# Patient Record
Sex: FEMALE | URBAN - METROPOLITAN AREA
[De-identification: names, ages, dates, MRNs, and addresses within clinical notes are randomized per-mention and may not be internally consistent; named-entity substitution may affect disease eponyms.]

---

## 2018-08-11 ENCOUNTER — APPOINTMENT (OUTPATIENT)
Dept: URBAN - METROPOLITAN AREA CLINIC 237 | Age: 73
Setting detail: DERMATOLOGY
End: 2018-08-11

## 2018-08-11 DIAGNOSIS — L85.3 XEROSIS CUTIS: ICD-10-CM

## 2018-08-11 DIAGNOSIS — L82.1 OTHER SEBORRHEIC KERATOSIS: ICD-10-CM

## 2018-08-11 DIAGNOSIS — B07.8 OTHER VIRAL WARTS: ICD-10-CM

## 2018-08-11 DIAGNOSIS — L57.8 OTHER SKIN CHANGES DUE TO CHRONIC EXPOSURE TO NONIONIZING RADIATION: ICD-10-CM

## 2018-08-11 DIAGNOSIS — Z41.9 ENCOUNTER FOR PROCEDURE FOR PURPOSES OTHER THAN REMEDYING HEALTH STATE, UNSPECIFIED: ICD-10-CM

## 2018-08-11 DIAGNOSIS — L82.0 INFLAMED SEBORRHEIC KERATOSIS: ICD-10-CM

## 2018-08-11 PROCEDURE — 99202 OFFICE O/P NEW SF 15 MIN: CPT | Mod: 25

## 2018-08-11 PROCEDURE — OTHER COUNSELING: OTHER

## 2018-08-11 PROCEDURE — OTHER PATIENT SPECIFIC COUNSELING: OTHER

## 2018-08-11 PROCEDURE — OTHER DIAGNOSIS COMMENT: OTHER

## 2018-08-11 PROCEDURE — 17110 DESTRUCT B9 LESION 1-14: CPT

## 2018-08-11 PROCEDURE — OTHER EDUCATIONAL RESOURCES PROVIDED: OTHER

## 2018-08-11 PROCEDURE — OTHER REASSURANCE: OTHER

## 2018-08-11 PROCEDURE — OTHER LIQUID NITROGEN: OTHER

## 2018-08-11 PROCEDURE — OTHER PRESCRIPTION: OTHER

## 2018-08-11 RX ORDER — AMMONIUM LACTATE 120 MG/G
SM AMT LOTION TOPICAL QHS
Qty: 1 | Refills: 0 | Status: ERX | COMMUNITY
Start: 2018-08-11

## 2018-08-11 ASSESSMENT — LOCATION ZONE DERM
LOCATION ZONE: TRUNK
LOCATION ZONE: LEG

## 2018-08-11 ASSESSMENT — LOCATION DETAILED DESCRIPTION DERM
LOCATION DETAILED: RIGHT LATERAL UPPER BACK
LOCATION DETAILED: RIGHT ANTERIOR DISTAL THIGH
LOCATION DETAILED: RIGHT RIB CAGE
LOCATION DETAILED: LEFT ANTERIOR DISTAL THIGH
LOCATION DETAILED: RIGHT DISTAL POSTERIOR THIGH
LOCATION DETAILED: LEFT DISTAL POSTERIOR THIGH

## 2018-08-11 ASSESSMENT — LOCATION SIMPLE DESCRIPTION DERM
LOCATION SIMPLE: LEFT THIGH
LOCATION SIMPLE: LEFT POSTERIOR THIGH
LOCATION SIMPLE: RIGHT THIGH
LOCATION SIMPLE: RIGHT UPPER BACK
LOCATION SIMPLE: RIGHT POSTERIOR THIGH
LOCATION SIMPLE: ABDOMEN

## 2018-08-11 NOTE — PROCEDURE: PATIENT SPECIFIC COUNSELING
MD recommends re-tx’ing with LN2 since it has such a large footprint. Advised lesion would will likely require multiple txs of LN2. If not responding to LN2, surgical removal is an option, but not ideal given size (large healing area if excised).
Detail Level: Simple
Discussed Juvederm Ultra Plus for chin, but Pt states she’s allergy to lidocaine (hives, no breathing problems), which is a component of Juvederm XC. Pt has used Xylocaine with Dr. Givens for some removal with no problems. MD would consider testing pt with lidocaine before filler. Pt would definitely need 2 syringes of filler. \\n\\nDiscussed Botox to mentalis and and upper lip to help soften rhytides (18 units). Advised this would be less expensive than Juvederm, but more temporary. Glabella (24 units) could also benefit from Botox (which Pt has had before).\\n\\nPt wonders if a facelift would be helpful. Ptwould benefit from facelift or mini facelift, but still would need fillers.
Discussed using LacH to any skin, which should also help crepey skin to some degree. \\n\\nAlso consider DerMend products in future for crepey skin. MD suggested RA to help build up collagen, but Pt used on face in the past and found very irritating.

## 2018-08-11 NOTE — PROCEDURE: LIQUID NITROGEN
Include Z78.9 (Other Specified Conditions Influencing Health Status) As An Associated Diagnosis?: No
Medical Necessity Clause: This procedure was medically necessary because the lesions that were treated were:
Number Of Freeze-Thaw Cycles: 3 freeze-thaw cycles
Consent: The patient/parent's consent was obtained including but not limited to risks of crusting, scabbing, blistering, scarring, darker or lighter pigmentary change, recurrence, incomplete removal.
Aperture Size (Optional): D
Detail Level: Simple
Medical Necessity Information: It is in your best interest to select a reason for this procedure from the list below. All of these items fulfill various CMS LCD requirements except the new and changing color options.
Post-Care Instructions: I reviewed with the patient in detail post-care instructions. Patient is to leave areas alone, and resume any home care if nothing happens, or when the scab falls off.

## 2018-10-10 ENCOUNTER — APPOINTMENT (OUTPATIENT)
Dept: URBAN - METROPOLITAN AREA CLINIC 237 | Age: 73
Setting detail: DERMATOLOGY
End: 2018-10-10

## 2018-10-10 DIAGNOSIS — R21 RASH AND OTHER NONSPECIFIC SKIN ERUPTION: ICD-10-CM

## 2018-10-10 DIAGNOSIS — B07.8 OTHER VIRAL WARTS: ICD-10-CM

## 2018-10-10 DIAGNOSIS — L82.0 INFLAMED SEBORRHEIC KERATOSIS: ICD-10-CM

## 2018-10-10 PROCEDURE — OTHER PRESCRIPTION: OTHER

## 2018-10-10 PROCEDURE — OTHER MIPS QUALITY: OTHER

## 2018-10-10 PROCEDURE — OTHER LIQUID NITROGEN: OTHER

## 2018-10-10 PROCEDURE — 99213 OFFICE O/P EST LOW 20 MIN: CPT | Mod: 25

## 2018-10-10 PROCEDURE — OTHER DIAGNOSIS COMMENT: OTHER

## 2018-10-10 PROCEDURE — OTHER PATIENT SPECIFIC COUNSELING: OTHER

## 2018-10-10 PROCEDURE — OTHER TREATMENT REGIMEN: OTHER

## 2018-10-10 PROCEDURE — OTHER ORDER TESTS: OTHER

## 2018-10-10 PROCEDURE — 17110 DESTRUCT B9 LESION 1-14: CPT

## 2018-10-10 RX ORDER — BETAMETHASONE DIPROPIONATE 0.5 MG/G
SMALL AMT CREAM TOPICAL BID
Qty: 1 | Refills: 0 | Status: ERX | COMMUNITY
Start: 2018-10-10

## 2018-10-10 ASSESSMENT — LOCATION DETAILED DESCRIPTION DERM
LOCATION DETAILED: RIGHT LATERAL ABDOMEN
LOCATION DETAILED: LEFT MID-UPPER BACK
LOCATION DETAILED: RIGHT RIB CAGE
LOCATION DETAILED: LEFT DISTAL PRETIBIAL REGION
LOCATION DETAILED: RIGHT LATERAL UPPER BACK
LOCATION DETAILED: LEFT MEDIAL UPPER BACK
LOCATION DETAILED: RIGHT DISTAL PRETIBIAL REGION
LOCATION DETAILED: INFERIOR THORACIC SPINE

## 2018-10-10 ASSESSMENT — LOCATION ZONE DERM
LOCATION ZONE: TRUNK
LOCATION ZONE: LEG

## 2018-10-10 ASSESSMENT — LOCATION SIMPLE DESCRIPTION DERM
LOCATION SIMPLE: RIGHT UPPER BACK
LOCATION SIMPLE: ABDOMEN
LOCATION SIMPLE: UPPER BACK
LOCATION SIMPLE: RIGHT PRETIBIAL REGION
LOCATION SIMPLE: LEFT PRETIBIAL REGION
LOCATION SIMPLE: LEFT UPPER BACK

## 2018-10-10 NOTE — PROCEDURE: LIQUID NITROGEN
Post-Care Instructions: I reviewed with the patient in detail post-care instructions. Patient is to leave areas alone, and resume any home care if nothing happens, or when the scab falls off.
Medical Necessity Information: It is in your best interest to select a reason for this procedure from the list below. All of these items fulfill various CMS LCD requirements except the new and changing color options.
Medical Necessity Clause: This procedure was medically necessary because the lesions that were treated were:
Detail Level: Simple
Include Z78.9 (Other Specified Conditions Influencing Health Status) As An Associated Diagnosis?: No
Consent: The patient/parent's consent was obtained including but not limited to risks of crusting, scabbing, blistering, scarring, darker or lighter pigmentary change, recurrence, incomplete removal.
Number Of Freeze-Thaw Cycles: 3 freeze-thaw cycles
Aperture Size (Optional): D

## 2018-10-10 NOTE — PROCEDURE: DIAGNOSIS COMMENT
Detail Level: Simple
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
Comment: Isaac CONTE
Detail Level: Detailed

## 2018-10-10 NOTE — PROCEDURE: PATIENT SPECIFIC COUNSELING
Advised to RTC in 1 mo since thick lesions will likely need more LN2.
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  Start DipAF cr bid to legs. Should use in place of steroid prescribed by oncologist (she won’t pick that up from the pharmacy).  Lab slip given for urinalysis. Pt not sure how she’ll give sample b/c she has incontinence, so suggested she take a sterile urine cup from the lab to get a sample at home.  Will recheck rash in 1 mo. \\n\\nPA reviewed CBC w/diff from oncologist. Notes numerous low values.
Detail Level: Zone
Detail Level: Simple

## 2018-10-31 ENCOUNTER — APPOINTMENT (OUTPATIENT)
Dept: URBAN - METROPOLITAN AREA CLINIC 237 | Age: 73
Setting detail: DERMATOLOGY
End: 2018-10-31

## 2018-10-31 DIAGNOSIS — Z02.9 ENCOUNTER FOR ADMINISTRATIVE EXAMINATIONS, UNSPECIFIED: ICD-10-CM

## 2018-10-31 PROCEDURE — OTHER NO SHOW PLAN: OTHER

## 2018-11-21 ENCOUNTER — APPOINTMENT (OUTPATIENT)
Dept: URBAN - METROPOLITAN AREA CLINIC 237 | Age: 73
Setting detail: DERMATOLOGY
End: 2018-11-21

## 2018-11-21 DIAGNOSIS — L85.3 XEROSIS CUTIS: ICD-10-CM

## 2018-11-21 DIAGNOSIS — R21 RASH AND OTHER NONSPECIFIC SKIN ERUPTION: ICD-10-CM

## 2018-11-21 DIAGNOSIS — L82.0 INFLAMED SEBORRHEIC KERATOSIS: ICD-10-CM

## 2018-11-21 PROCEDURE — 17110 DESTRUCT B9 LESION 1-14: CPT

## 2018-11-21 PROCEDURE — OTHER PATIENT SPECIFIC COUNSELING: OTHER

## 2018-11-21 PROCEDURE — 99213 OFFICE O/P EST LOW 20 MIN: CPT | Mod: 25

## 2018-11-21 PROCEDURE — OTHER DIAGNOSIS COMMENT: OTHER

## 2018-11-21 PROCEDURE — OTHER MIPS QUALITY: OTHER

## 2018-11-21 PROCEDURE — OTHER TREATMENT REGIMEN: OTHER

## 2018-11-21 PROCEDURE — OTHER LIQUID NITROGEN: OTHER

## 2018-11-21 ASSESSMENT — LOCATION SIMPLE DESCRIPTION DERM
LOCATION SIMPLE: RIGHT UPPER BACK
LOCATION SIMPLE: RIGHT PRETIBIAL REGION
LOCATION SIMPLE: LEFT UPPER BACK
LOCATION SIMPLE: LEFT PRETIBIAL REGION

## 2018-11-21 ASSESSMENT — LOCATION DETAILED DESCRIPTION DERM
LOCATION DETAILED: LEFT MID-UPPER BACK
LOCATION DETAILED: LEFT DISTAL PRETIBIAL REGION
LOCATION DETAILED: RIGHT DISTAL PRETIBIAL REGION
LOCATION DETAILED: LEFT LATERAL UPPER BACK
LOCATION DETAILED: RIGHT MID-UPPER BACK

## 2018-11-21 ASSESSMENT — LOCATION ZONE DERM
LOCATION ZONE: LEG
LOCATION ZONE: TRUNK

## 2018-11-21 NOTE — PROCEDURE: PATIENT SPECIFIC COUNSELING
Detail Level: Simple
Pt states LacH made legs itch. Since Pt tried this prior to rash occurring, PA recommends re-trying LacH, but apply to one foot before applying all over legs in case itch occurs. Various other samples given for Pt to try for moisturizing
Detail Level: Zone
Reassured rash is resolved. Keep Dip AF on hand in case of any flares.

## 2018-11-21 NOTE — PROCEDURE: LIQUID NITROGEN
Post-Care Instructions: I reviewed with the patient in detail post-care instructions. Patient is to leave areas alone, and resume any home care if nothing happens, or when the scab falls off.
Consent: The patient/parent's consent was obtained including but not limited to risks of crusting, scabbing, blistering, scarring, darker or lighter pigmentary change, recurrence, incomplete removal.
Render Post-Care Instructions In Note?: no
Detail Level: Simple
Medical Necessity Information: It is in your best interest to select a reason for this procedure from the list below. All of these items fulfill various CMS LCD requirements except the new and changing color options.
Aperture Size (Optional): D
Medical Necessity Clause: This procedure was medically necessary because the lesions that were treated were:
Number Of Freeze-Thaw Cycles: 3 freeze-thaw cycles

## 2018-11-21 NOTE — PROCEDURE: TREATMENT REGIMEN
Initiate Treatment: LacH qhs/(S) Various moisturizers qhs
Detail Level: Zone
Modify Regimen: DipAF cr bid prn

## 2019-03-08 ENCOUNTER — APPOINTMENT (OUTPATIENT)
Dept: URBAN - METROPOLITAN AREA CLINIC 237 | Age: 74
Setting detail: DERMATOLOGY
End: 2019-03-09

## 2019-03-08 DIAGNOSIS — L29.9 PRURITUS, UNSPECIFIED: ICD-10-CM

## 2019-03-08 DIAGNOSIS — D49.2 NEOPLASM OF UNSPECIFIED BEHAVIOR OF BONE, SOFT TISSUE, AND SKIN: ICD-10-CM

## 2019-03-08 PROCEDURE — 99214 OFFICE O/P EST MOD 30 MIN: CPT

## 2019-03-08 PROCEDURE — OTHER MIPS QUALITY: OTHER

## 2019-03-08 PROCEDURE — OTHER TREATMENT REGIMEN: OTHER

## 2019-03-08 PROCEDURE — OTHER PATIENT SPECIFIC COUNSELING: OTHER

## 2019-03-08 PROCEDURE — OTHER OTHER: OTHER

## 2019-03-08 PROCEDURE — OTHER DIAGNOSIS COMMENT: OTHER

## 2019-03-08 ASSESSMENT — LOCATION SIMPLE DESCRIPTION DERM
LOCATION SIMPLE: RIGHT THIGH
LOCATION SIMPLE: RIGHT UPPER BACK
LOCATION SIMPLE: RIGHT CALF
LOCATION SIMPLE: LEFT POSTERIOR UPPER ARM
LOCATION SIMPLE: LEFT PRETIBIAL REGION
LOCATION SIMPLE: RIGHT POSTERIOR UPPER ARM
LOCATION SIMPLE: CHEST
LOCATION SIMPLE: RIGHT PRETIBIAL REGION
LOCATION SIMPLE: LEFT THIGH
LOCATION SIMPLE: ABDOMEN

## 2019-03-08 ASSESSMENT — LOCATION DETAILED DESCRIPTION DERM
LOCATION DETAILED: LEFT ANTERIOR DISTAL THIGH
LOCATION DETAILED: RIGHT PROXIMAL PRETIBIAL REGION
LOCATION DETAILED: RIGHT ANTERIOR PROXIMAL THIGH
LOCATION DETAILED: MIDDLE STERNUM
LOCATION DETAILED: LEFT DISTAL POSTERIOR UPPER ARM
LOCATION DETAILED: LEFT PROXIMAL PRETIBIAL REGION
LOCATION DETAILED: RIGHT MEDIAL UPPER BACK
LOCATION DETAILED: RIGHT DISTAL LATERAL CALF
LOCATION DETAILED: RIGHT DISTAL POSTERIOR UPPER ARM
LOCATION DETAILED: EPIGASTRIC SKIN

## 2019-03-08 ASSESSMENT — LOCATION ZONE DERM
LOCATION ZONE: TRUNK
LOCATION ZONE: ARM
LOCATION ZONE: LEG

## 2019-03-08 ASSESSMENT — ITCH INTENSITY: HOW SEVERE IS YOUR ITCHING?: 10

## 2019-03-08 NOTE — PROCEDURE: OTHER
Note Text (......Xxx Chief Complaint.): This diagnosis correlates with the SKs below bra line.
Other (Free Text): MD massaged in multiple layers of Tricalm to Pt’s back, arms, and legs. She did not find it very helpful.\\n\\nNo signs of scabies.
Detail Level: Simple

## 2019-03-08 NOTE — PROCEDURE: MIPS QUALITY
Detail Level: Detailed
Quality 474: Zoster Vaccination Status: Shingrix Vaccination not Administered or Previously Received, Reason not Otherwise Specified
Quality 110: Preventive Care And Screening: Influenza Immunization: Influenza Immunization not Administered for Documented Reasons.
Quality 111:Pneumonia Vaccination Status For Older Adults: Pneumococcal Vaccination not Administered or Previously Received, Reason not Otherwise Specified

## 2019-03-08 NOTE — PROCEDURE: DIAGNOSIS COMMENT
Comment: Poss secondary to metastatic breast cancer (torie since spread to the liver)
Comment: Poss inflammatory vs SCCIS
Detail Level: Simple

## 2019-03-08 NOTE — PROCEDURE: TREATMENT REGIMEN
Initiate Treatment: (Sx3) Cloderm sparingly qd-bid
Detail Level: Zone
Samples Given: (S) Itch-X ad sarah
Detail Level: Simple
Plan: May need to try UVL
Initiate Treatment: Sarna Original/Sensitive ad sarah

## 2019-03-08 NOTE — HPI: PRURITUS
How Did Your Itching Occur?: sudden in onset (over a period of weeks to a few months)
Additional History: Using: OTC moisturizer from [employer] (some active ingredients: turmeric, neem); an almond bar soap from [employer] (not scented)

## 2019-03-15 ENCOUNTER — APPOINTMENT (OUTPATIENT)
Dept: URBAN - METROPOLITAN AREA CLINIC 237 | Age: 74
Setting detail: DERMATOLOGY
End: 2019-03-16

## 2019-03-15 DIAGNOSIS — D49.2 NEOPLASM OF UNSPECIFIED BEHAVIOR OF BONE, SOFT TISSUE, AND SKIN: ICD-10-CM

## 2019-03-15 DIAGNOSIS — L29.9 PRURITUS, UNSPECIFIED: ICD-10-CM

## 2019-03-15 DIAGNOSIS — L24 IRRITANT CONTACT DERMATITIS: ICD-10-CM

## 2019-03-15 PROBLEM — L24.9 IRRITANT CONTACT DERMATITIS, UNSPECIFIED CAUSE: Status: ACTIVE | Noted: 2019-03-15

## 2019-03-15 PROCEDURE — OTHER MIPS QUALITY: OTHER

## 2019-03-15 PROCEDURE — OTHER TREATMENT REGIMEN: OTHER

## 2019-03-15 PROCEDURE — OTHER PATIENT SPECIFIC COUNSELING: OTHER

## 2019-03-15 PROCEDURE — OTHER PRESCRIPTION: OTHER

## 2019-03-15 PROCEDURE — 99214 OFFICE O/P EST MOD 30 MIN: CPT

## 2019-03-15 PROCEDURE — OTHER DIAGNOSIS COMMENT: OTHER

## 2019-03-15 RX ORDER — HYDROXYZINE HYDROCHLORIDE 10 MG/1
1-3 TABLET, FILM COATED ORAL PRN
Qty: 100 | Refills: 0 | Status: ERX | COMMUNITY
Start: 2019-03-15

## 2019-03-15 ASSESSMENT — LOCATION ZONE DERM
LOCATION ZONE: TRUNK
LOCATION ZONE: LEG
LOCATION ZONE: ARM

## 2019-03-15 ASSESSMENT — LOCATION DETAILED DESCRIPTION DERM
LOCATION DETAILED: LEFT DISTAL POSTERIOR UPPER ARM
LOCATION DETAILED: LEFT ANTERIOR DISTAL THIGH
LOCATION DETAILED: RIGHT MEDIAL UPPER BACK
LOCATION DETAILED: STERNAL NOTCH
LOCATION DETAILED: RIGHT DISTAL LATERAL CALF
LOCATION DETAILED: RIGHT DISTAL POSTERIOR UPPER ARM
LOCATION DETAILED: EPIGASTRIC SKIN
LOCATION DETAILED: LEFT PROXIMAL PRETIBIAL REGION
LOCATION DETAILED: RIGHT ANTERIOR PROXIMAL THIGH
LOCATION DETAILED: MIDDLE STERNUM
LOCATION DETAILED: RIGHT PROXIMAL PRETIBIAL REGION

## 2019-03-15 ASSESSMENT — LOCATION SIMPLE DESCRIPTION DERM
LOCATION SIMPLE: RIGHT PRETIBIAL REGION
LOCATION SIMPLE: RIGHT UPPER BACK
LOCATION SIMPLE: LEFT PRETIBIAL REGION
LOCATION SIMPLE: ABDOMEN
LOCATION SIMPLE: CHEST
LOCATION SIMPLE: RIGHT POSTERIOR UPPER ARM
LOCATION SIMPLE: LEFT THIGH
LOCATION SIMPLE: LEFT POSTERIOR UPPER ARM
LOCATION SIMPLE: RIGHT THIGH
LOCATION SIMPLE: RIGHT CALF

## 2019-03-15 NOTE — PROCEDURE: DIAGNOSIS COMMENT
Detail Level: Simple
Comment: Poss inflammatory vs SCCIS vs Ps
Comment: Poss secondary to metastatic breast cancer (torie since spread to the liver)

## 2019-03-15 NOTE — PROCEDURE: TREATMENT REGIMEN
Modify Regimen: Suggested retry Gato Salamanca
Initiate Treatment: (S) Sernivo qd-bid
Initiate Treatment: Hydroxyzine 10 mg 1-3 tablets q 6-8 hours prn itch\\nSuggested cold compresses
Detail Level: Simple
Detail Level: Zone

## 2019-03-15 NOTE — PROCEDURE: PATIENT SPECIFIC COUNSELING
MD will have to discuss pt’s case with Dr. Tillman, her oncologist.\\n\\Hemalatha the meantime, we will start pt on Hydroxyzine to help with her itching. Advised may cause drowsiness.
It is quite possible that pt developed an allergy to the Sarna, but MD thinks it may be more of an ICD to overuse.
Since pt’s mother has psoriasis, that is a possibility here. MD reluctant to bx here re: poor healing, and pt’s itching should be a priority.
Detail Level: Zone

## 2019-03-15 NOTE — PROCEDURE: MIPS QUALITY
Detail Level: Detailed
Quality 110: Preventive Care And Screening: Influenza Immunization: Influenza Immunization not Administered for Documented Reasons.
Quality 111:Pneumonia Vaccination Status For Older Adults: Pneumococcal Vaccination not Administered or Previously Received, Reason not Otherwise Specified
Quality 474: Zoster Vaccination Status: Shingrix Vaccination not Administered or Previously Received, Reason not Otherwise Specified

## 2019-03-29 ENCOUNTER — APPOINTMENT (OUTPATIENT)
Dept: URBAN - METROPOLITAN AREA CLINIC 237 | Age: 74
Setting detail: DERMATOLOGY
End: 2019-04-10

## 2019-03-29 ENCOUNTER — APPOINTMENT (OUTPATIENT)
Dept: URBAN - METROPOLITAN AREA CLINIC 237 | Age: 74
Setting detail: DERMATOLOGY
End: 2019-03-29

## 2019-03-29 DIAGNOSIS — L29.9 PRURITUS, UNSPECIFIED: ICD-10-CM

## 2019-03-29 DIAGNOSIS — D49.2 NEOPLASM OF UNSPECIFIED BEHAVIOR OF BONE, SOFT TISSUE, AND SKIN: ICD-10-CM

## 2019-03-29 DIAGNOSIS — Z02.9 ENCOUNTER FOR ADMINISTRATIVE EXAMINATIONS, UNSPECIFIED: ICD-10-CM

## 2019-03-29 PROCEDURE — OTHER TREATMENT REGIMEN: OTHER

## 2019-03-29 PROCEDURE — OTHER PRESCRIPTION: OTHER

## 2019-03-29 PROCEDURE — OTHER MIPS QUALITY: OTHER

## 2019-03-29 PROCEDURE — OTHER DIAGNOSIS COMMENT: OTHER

## 2019-03-29 PROCEDURE — OTHER PATIENT SPECIFIC COUNSELING: OTHER

## 2019-03-29 RX ORDER — HYDROXYZINE HYDROCHLORIDE 10 MG/1
1-3 TABLET, FILM COATED ORAL PRN
Qty: 100 | Refills: 0 | Status: CANCELLED
Stop reason: WASHOUT

## 2019-03-29 ASSESSMENT — LOCATION SIMPLE DESCRIPTION DERM
LOCATION SIMPLE: LEFT PRETIBIAL REGION
LOCATION SIMPLE: RIGHT UPPER BACK
LOCATION SIMPLE: CHEST
LOCATION SIMPLE: RIGHT PRETIBIAL REGION
LOCATION SIMPLE: LEFT THIGH
LOCATION SIMPLE: RIGHT CALF
LOCATION SIMPLE: RIGHT THIGH
LOCATION SIMPLE: RIGHT POSTERIOR UPPER ARM
LOCATION SIMPLE: LEFT POSTERIOR UPPER ARM
LOCATION SIMPLE: ABDOMEN

## 2019-03-29 ASSESSMENT — LOCATION DETAILED DESCRIPTION DERM
LOCATION DETAILED: LEFT PROXIMAL PRETIBIAL REGION
LOCATION DETAILED: EPIGASTRIC SKIN
LOCATION DETAILED: RIGHT ANTERIOR PROXIMAL THIGH
LOCATION DETAILED: RIGHT DISTAL POSTERIOR UPPER ARM
LOCATION DETAILED: LEFT ANTERIOR DISTAL THIGH
LOCATION DETAILED: RIGHT DISTAL LATERAL CALF
LOCATION DETAILED: LEFT DISTAL POSTERIOR UPPER ARM
LOCATION DETAILED: RIGHT MEDIAL UPPER BACK
LOCATION DETAILED: RIGHT PROXIMAL PRETIBIAL REGION
LOCATION DETAILED: MIDDLE STERNUM

## 2019-03-29 ASSESSMENT — LOCATION ZONE DERM
LOCATION ZONE: TRUNK
LOCATION ZONE: ARM
LOCATION ZONE: LEG

## 2019-03-29 NOTE — PROCEDURE: DIAGNOSIS COMMENT
Detail Level: Simple
Comment: Poss secondary to metastatic breast cancer (torie since spread to the liver)
Comment: Poss inflammatory vs SCCIS vs Ps

## 2019-03-29 NOTE — PROCEDURE: PATIENT SPECIFIC COUNSELING
Since pt’s mother has psoriasis, that is a possibility here. MD reluctant to bx here re: poor healing, and pt’s itching should be a priority.
Detail Level: Zone
MD will have to discuss pt’s case with Dr. Tillman, her oncologist.\\n\\Hemalatha the meantime, we will start pt on Hydroxyzine to help with her itching. Advised may cause drowsiness.

## 2019-03-29 NOTE — PROCEDURE: TREATMENT REGIMEN
Detail Level: Zone
Initiate Treatment: (S) Sernivo qd-bid
Detail Level: Simple
Modify Regimen: Suggested retry Gato Salamanca
Initiate Treatment: Hydroxyzine 10 mg 1-3 tablets q 6-8 hours prn itch\\nSuggested cold compresses

## 2019-03-29 NOTE — PROCEDURE: MIPS QUALITY
Quality 110: Preventive Care And Screening: Influenza Immunization: Influenza Immunization not Administered for Documented Reasons.
Quality 111:Pneumonia Vaccination Status For Older Adults: Pneumococcal Vaccination not Administered or Previously Received, Reason not Otherwise Specified
Detail Level: Detailed
Quality 474: Zoster Vaccination Status: Shingrix Vaccination not Administered or Previously Received, Reason not Otherwise Specified